# Patient Record
Sex: FEMALE | Race: BLACK OR AFRICAN AMERICAN | NOT HISPANIC OR LATINO | URBAN - METROPOLITAN AREA
[De-identification: names, ages, dates, MRNs, and addresses within clinical notes are randomized per-mention and may not be internally consistent; named-entity substitution may affect disease eponyms.]

---

## 2021-04-24 ENCOUNTER — INPATIENT (INPATIENT)
Facility: HOSPITAL | Age: 51
LOS: 1 days | Discharge: HOME | End: 2021-04-26
Attending: STUDENT IN AN ORGANIZED HEALTH CARE EDUCATION/TRAINING PROGRAM | Admitting: STUDENT IN AN ORGANIZED HEALTH CARE EDUCATION/TRAINING PROGRAM
Payer: COMMERCIAL

## 2021-04-24 VITALS
HEART RATE: 108 BPM | DIASTOLIC BLOOD PRESSURE: 78 MMHG | TEMPERATURE: 102 F | SYSTOLIC BLOOD PRESSURE: 122 MMHG | RESPIRATION RATE: 18 BRPM | OXYGEN SATURATION: 100 %

## 2021-04-24 DIAGNOSIS — K57.92 DIVERTICULITIS OF INTESTINE, PART UNSPECIFIED, WITHOUT PERFORATION OR ABSCESS WITHOUT BLEEDING: ICD-10-CM

## 2021-04-24 LAB
ALBUMIN SERPL ELPH-MCNC: 3.8 G/DL — SIGNIFICANT CHANGE UP (ref 3.3–5)
ALP SERPL-CCNC: 64 U/L — SIGNIFICANT CHANGE UP (ref 40–120)
ALT FLD-CCNC: 7 U/L — SIGNIFICANT CHANGE UP (ref 4–33)
ANION GAP SERPL CALC-SCNC: 14 MMOL/L — SIGNIFICANT CHANGE UP (ref 7–14)
ANISOCYTOSIS BLD QL: SLIGHT — SIGNIFICANT CHANGE UP
APPEARANCE UR: CLEAR — SIGNIFICANT CHANGE UP
APTT BLD: 39.4 SEC — HIGH (ref 27–36.3)
AST SERPL-CCNC: 12 U/L — SIGNIFICANT CHANGE UP (ref 4–32)
BACTERIA # UR AUTO: ABNORMAL
BASE EXCESS BLDV CALC-SCNC: -0.2 MMOL/L — SIGNIFICANT CHANGE UP (ref -3–2)
BASOPHILS # BLD AUTO: 0.02 K/UL — SIGNIFICANT CHANGE UP (ref 0–0.2)
BASOPHILS NFR BLD AUTO: 0.1 % — SIGNIFICANT CHANGE UP (ref 0–2)
BILIRUB SERPL-MCNC: 0.8 MG/DL — SIGNIFICANT CHANGE UP (ref 0.2–1.2)
BILIRUB UR-MCNC: NEGATIVE — SIGNIFICANT CHANGE UP
BLOOD GAS VENOUS - CREATININE: 1 MG/DL — SIGNIFICANT CHANGE UP (ref 0.5–1.3)
BLOOD GAS VENOUS COMPREHENSIVE RESULT: SIGNIFICANT CHANGE UP
BUN SERPL-MCNC: 8 MG/DL — SIGNIFICANT CHANGE UP (ref 7–23)
CALCIUM SERPL-MCNC: 9.5 MG/DL — SIGNIFICANT CHANGE UP (ref 8.4–10.5)
CHLORIDE BLDV-SCNC: 107 MMOL/L — SIGNIFICANT CHANGE UP (ref 96–108)
CHLORIDE SERPL-SCNC: 100 MMOL/L — SIGNIFICANT CHANGE UP (ref 98–107)
CO2 SERPL-SCNC: 22 MMOL/L — SIGNIFICANT CHANGE UP (ref 22–31)
COLOR SPEC: YELLOW — SIGNIFICANT CHANGE UP
CREAT SERPL-MCNC: 0.93 MG/DL — SIGNIFICANT CHANGE UP (ref 0.5–1.3)
DIFF PNL FLD: ABNORMAL
EOSINOPHIL # BLD AUTO: 0.01 K/UL — SIGNIFICANT CHANGE UP (ref 0–0.5)
EOSINOPHIL NFR BLD AUTO: 0.1 % — SIGNIFICANT CHANGE UP (ref 0–6)
EPI CELLS # UR: 3 /HPF — SIGNIFICANT CHANGE UP (ref 0–5)
GAS PNL BLDV: 133 MMOL/L — LOW (ref 136–146)
GLUCOSE BLDV-MCNC: 140 MG/DL — HIGH (ref 70–99)
GLUCOSE SERPL-MCNC: 142 MG/DL — HIGH (ref 70–99)
GLUCOSE UR QL: NEGATIVE — SIGNIFICANT CHANGE UP
HCG SERPL-ACNC: <5 MIU/ML — SIGNIFICANT CHANGE UP
HCO3 BLDV-SCNC: 24 MMOL/L — SIGNIFICANT CHANGE UP (ref 20–27)
HCT VFR BLD CALC: 35.6 % — SIGNIFICANT CHANGE UP (ref 34.5–45)
HCT VFR BLDA CALC: 37.8 % — SIGNIFICANT CHANGE UP (ref 34.5–46.5)
HGB BLD CALC-MCNC: 12.3 G/DL — SIGNIFICANT CHANGE UP (ref 11.5–15.5)
HGB BLD-MCNC: 11.5 G/DL — SIGNIFICANT CHANGE UP (ref 11.5–15.5)
HYPOCHROMIA BLD QL: SIGNIFICANT CHANGE UP
IANC: 11.74 K/UL — HIGH (ref 1.5–8.5)
IMM GRANULOCYTES NFR BLD AUTO: 0.3 % — SIGNIFICANT CHANGE UP (ref 0–1.5)
INR BLD: 1.24 RATIO — HIGH (ref 0.88–1.16)
KETONES UR-MCNC: ABNORMAL
LACTATE BLDV-MCNC: 1.6 MMOL/L — SIGNIFICANT CHANGE UP (ref 0.5–2)
LEUKOCYTE ESTERASE UR-ACNC: NEGATIVE — SIGNIFICANT CHANGE UP
LYMPHOCYTES # BLD AUTO: 1.15 K/UL — SIGNIFICANT CHANGE UP (ref 1–3.3)
LYMPHOCYTES # BLD AUTO: 8.3 % — LOW (ref 13–44)
MANUAL SMEAR VERIFICATION: SIGNIFICANT CHANGE UP
MCHC RBC-ENTMCNC: 21.7 PG — LOW (ref 27–34)
MCHC RBC-ENTMCNC: 32.3 GM/DL — SIGNIFICANT CHANGE UP (ref 32–36)
MCV RBC AUTO: 67.3 FL — LOW (ref 80–100)
MICROCYTES BLD QL: SIGNIFICANT CHANGE UP
MONOCYTES # BLD AUTO: 0.92 K/UL — HIGH (ref 0–0.9)
MONOCYTES NFR BLD AUTO: 6.6 % — SIGNIFICANT CHANGE UP (ref 2–14)
NEUTROPHILS # BLD AUTO: 11.74 K/UL — HIGH (ref 1.8–7.4)
NEUTROPHILS NFR BLD AUTO: 84.6 % — HIGH (ref 43–77)
NITRITE UR-MCNC: NEGATIVE — SIGNIFICANT CHANGE UP
NRBC # BLD: 0 /100 WBCS — SIGNIFICANT CHANGE UP
NRBC # FLD: 0 K/UL — SIGNIFICANT CHANGE UP
PCO2 BLDV: 40 MMHG — SIGNIFICANT CHANGE UP (ref 39–42)
PH BLDV: 7.39 — SIGNIFICANT CHANGE UP (ref 7.32–7.43)
PH UR: 6 — SIGNIFICANT CHANGE UP (ref 5–8)
PLAT MORPH BLD: NORMAL — SIGNIFICANT CHANGE UP
PLATELET # BLD AUTO: 247 K/UL — SIGNIFICANT CHANGE UP (ref 150–400)
PLATELET COUNT - ESTIMATE: NORMAL — SIGNIFICANT CHANGE UP
PO2 BLDV: 41 MMHG — HIGH (ref 35–40)
POLYCHROMASIA BLD QL SMEAR: SLIGHT — SIGNIFICANT CHANGE UP
POTASSIUM BLDV-SCNC: 3.5 MMOL/L — SIGNIFICANT CHANGE UP (ref 3.4–4.5)
POTASSIUM SERPL-MCNC: 3.7 MMOL/L — SIGNIFICANT CHANGE UP (ref 3.5–5.3)
POTASSIUM SERPL-SCNC: 3.7 MMOL/L — SIGNIFICANT CHANGE UP (ref 3.5–5.3)
PROT SERPL-MCNC: 7.9 G/DL — SIGNIFICANT CHANGE UP (ref 6–8.3)
PROT UR-MCNC: ABNORMAL
PROTHROM AB SERPL-ACNC: 14.1 SEC — HIGH (ref 10.6–13.6)
RBC # BLD: 5.29 M/UL — HIGH (ref 3.8–5.2)
RBC # FLD: 14 % — SIGNIFICANT CHANGE UP (ref 10.3–14.5)
RBC BLD AUTO: ABNORMAL
RBC CASTS # UR COMP ASSIST: 1 /HPF — SIGNIFICANT CHANGE UP (ref 0–4)
SAO2 % BLDV: 75.6 % — SIGNIFICANT CHANGE UP (ref 60–85)
SARS-COV-2 RNA SPEC QL NAA+PROBE: SIGNIFICANT CHANGE UP
SODIUM SERPL-SCNC: 136 MMOL/L — SIGNIFICANT CHANGE UP (ref 135–145)
SP GR SPEC: 1.02 — SIGNIFICANT CHANGE UP (ref 1.01–1.02)
TARGETS BLD QL SMEAR: SIGNIFICANT CHANGE UP
UROBILINOGEN FLD QL: SIGNIFICANT CHANGE UP
WBC # BLD: 13.88 K/UL — HIGH (ref 3.8–10.5)
WBC # FLD AUTO: 13.88 K/UL — HIGH (ref 3.8–10.5)
WBC UR QL: 3 /HPF — SIGNIFICANT CHANGE UP (ref 0–5)

## 2021-04-24 PROCEDURE — 74177 CT ABD & PELVIS W/CONTRAST: CPT | Mod: 26

## 2021-04-24 PROCEDURE — 99285 EMERGENCY DEPT VISIT HI MDM: CPT

## 2021-04-24 PROCEDURE — 99221 1ST HOSP IP/OBS SF/LOW 40: CPT | Mod: GC

## 2021-04-24 PROCEDURE — 71045 X-RAY EXAM CHEST 1 VIEW: CPT | Mod: 26

## 2021-04-24 RX ORDER — SODIUM CHLORIDE 9 MG/ML
1000 INJECTION, SOLUTION INTRAVENOUS
Refills: 0 | Status: DISCONTINUED | OUTPATIENT
Start: 2021-04-24 | End: 2021-04-25

## 2021-04-24 RX ORDER — SODIUM CHLORIDE 9 MG/ML
2500 INJECTION INTRAMUSCULAR; INTRAVENOUS; SUBCUTANEOUS ONCE
Refills: 0 | Status: COMPLETED | OUTPATIENT
Start: 2021-04-24 | End: 2021-04-24

## 2021-04-24 RX ORDER — PIPERACILLIN AND TAZOBACTAM 4; .5 G/20ML; G/20ML
3.38 INJECTION, POWDER, LYOPHILIZED, FOR SOLUTION INTRAVENOUS ONCE
Refills: 0 | Status: COMPLETED | OUTPATIENT
Start: 2021-04-24 | End: 2021-04-24

## 2021-04-24 RX ORDER — METRONIDAZOLE 500 MG
500 TABLET ORAL ONCE
Refills: 0 | Status: COMPLETED | OUTPATIENT
Start: 2021-04-24 | End: 2021-04-24

## 2021-04-24 RX ORDER — HYDROMORPHONE HYDROCHLORIDE 2 MG/ML
0.5 INJECTION INTRAMUSCULAR; INTRAVENOUS; SUBCUTANEOUS EVERY 4 HOURS
Refills: 0 | Status: DISCONTINUED | OUTPATIENT
Start: 2021-04-24 | End: 2021-04-25

## 2021-04-24 RX ORDER — ACETAMINOPHEN 500 MG
975 TABLET ORAL ONCE
Refills: 0 | Status: COMPLETED | OUTPATIENT
Start: 2021-04-24 | End: 2021-04-24

## 2021-04-24 RX ORDER — KETOROLAC TROMETHAMINE 30 MG/ML
15 SYRINGE (ML) INJECTION ONCE
Refills: 0 | Status: DISCONTINUED | OUTPATIENT
Start: 2021-04-24 | End: 2021-04-24

## 2021-04-24 RX ORDER — ENOXAPARIN SODIUM 100 MG/ML
40 INJECTION SUBCUTANEOUS DAILY
Refills: 0 | Status: DISCONTINUED | OUTPATIENT
Start: 2021-04-24 | End: 2021-04-26

## 2021-04-24 RX ORDER — PIPERACILLIN AND TAZOBACTAM 4; .5 G/20ML; G/20ML
3.38 INJECTION, POWDER, LYOPHILIZED, FOR SOLUTION INTRAVENOUS EVERY 8 HOURS
Refills: 0 | Status: DISCONTINUED | OUTPATIENT
Start: 2021-04-24 | End: 2021-04-26

## 2021-04-24 RX ORDER — ACETAMINOPHEN 500 MG
1000 TABLET ORAL ONCE
Refills: 0 | Status: COMPLETED | OUTPATIENT
Start: 2021-04-24 | End: 2021-04-24

## 2021-04-24 RX ORDER — HYDROMORPHONE HYDROCHLORIDE 2 MG/ML
1 INJECTION INTRAMUSCULAR; INTRAVENOUS; SUBCUTANEOUS EVERY 4 HOURS
Refills: 0 | Status: DISCONTINUED | OUTPATIENT
Start: 2021-04-24 | End: 2021-04-26

## 2021-04-24 RX ADMIN — SODIUM CHLORIDE 125 MILLILITER(S): 9 INJECTION, SOLUTION INTRAVENOUS at 17:58

## 2021-04-24 RX ADMIN — Medication 15 MILLIGRAM(S): at 12:03

## 2021-04-24 RX ADMIN — PIPERACILLIN AND TAZOBACTAM 200 GRAM(S): 4; .5 INJECTION, POWDER, LYOPHILIZED, FOR SOLUTION INTRAVENOUS at 15:01

## 2021-04-24 RX ADMIN — Medication 400 MILLIGRAM(S): at 21:01

## 2021-04-24 RX ADMIN — Medication 975 MILLIGRAM(S): at 11:49

## 2021-04-24 RX ADMIN — PIPERACILLIN AND TAZOBACTAM 25 GRAM(S): 4; .5 INJECTION, POWDER, LYOPHILIZED, FOR SOLUTION INTRAVENOUS at 22:56

## 2021-04-24 RX ADMIN — Medication 15 MILLIGRAM(S): at 13:27

## 2021-04-24 RX ADMIN — Medication 1000 MILLIGRAM(S): at 21:36

## 2021-04-24 RX ADMIN — Medication 975 MILLIGRAM(S): at 13:27

## 2021-04-24 RX ADMIN — Medication 100 MILLIGRAM(S): at 12:03

## 2021-04-24 RX ADMIN — SODIUM CHLORIDE 2500 MILLILITER(S): 9 INJECTION INTRAMUSCULAR; INTRAVENOUS; SUBCUTANEOUS at 11:30

## 2021-04-24 NOTE — ED PROVIDER NOTE - ATTENDING CONTRIBUTION TO CARE
Awake, Alert, Conversant.  Resting comfortably.  Breath sounds clear in all lung fields.  Normal and regular heart rate without murmurs, rubs, or gallops.  Normal S1/S2.  Abdomen soft, tender to palpation in left lower quadrant without rebound or guarding.  No lower extremity swelling or tenderness.  Oriented and conversant with fluent speech, moving all extremities with good strength.    Dr. Webb: I agree with the above provided history and exam and addend/modify it as follows.  50F Denies PMHx P/W LLQ pain x 3 days, accompanied by chills.  Possible diverticulitis vs colitis.  Appendicitis less likely but will R/O.  Doubt ovarian etiology given duration and character of pain.    Plan for labs, CT abdomen/pelvis, analgesia, empiric antibiotics given high likelihood of diverticulitis.    I Johnathan Webb MD performed a history and physical exam of the patient and discussed their management with the resident and /or advanced care provider. I reviewed the resident and /or ACP's note and agree with the documented findings and plan of care. My medical decision making and observations are found above.

## 2021-04-24 NOTE — ED PROVIDER NOTE - NS ED ROS FT
GENERAL: + fever and chills  EYES: No change in vision  HEENT: No trouble swallowing or speaking  CARDIAC: No chest pain  PULMONARY: No cough or SOB  GI: + abdominal pain, no nausea or no vomiting, no diarrhea + constipation  : No changes in urination  SKIN: No rashes  NEURO: No headache, no numbness  MSK: No joint pain  Otherwise as HPI or negative.

## 2021-04-24 NOTE — ED PROVIDER NOTE - PHYSICAL EXAMINATION
Physical Exam:  General: NAD, Conversive  Eyes: EOMI, Conjunctiva and sclera clear  Neck: No JVD  Heart: Normal S1, S2, no murmurs  Abdomen: Soft, tender to palpation on LLQ, nondistended, no CVA tenderness  Extremities: 2+ peripheral pulses, no edema  Psych: AAO X3  Neurologic: Non-focal

## 2021-04-24 NOTE — H&P ADULT - NSHPPHYSICALEXAM_GEN_ALL_CORE
Physical Examination:  GEN: NAD, resting quietly  NEURO: AAOx3, CN II-XII grossly intact, no focal deficits  PULM: symmetric chest rise bilaterally, no increased WOB  ABD: soft, tender to palpation in the LLQ with localized rebound and guarding but no tenderness or rebound  EXTR: no cyanosis or edema, moving all extremities

## 2021-04-24 NOTE — ED PROVIDER NOTE - CLINICAL SUMMARY MEDICAL DECISION MAKING FREE TEXT BOX
50 Y F presenting with LLQ pain. primary concern for diverticulitis (fever,chills, LLQ pain), lower likelihood but possible concern for ovarian pathology including ovarian abscess (LLQ, but no STI/STD history, no high risk sexual behaviors), stearchocholitis. Common labs, CT AP, fluids, antibiotics. 50 Y F presenting with LLQ pain. primary concern for diverticulitis (fever,chills, LLQ pain), lower likelihood but possible concern for ovarian pathology including ovarian abscess (LLQ, but no STI/STD history, no high risk sexual behaviors), stercoral colitis. Common labs, CT AP, fluids, antibiotics.

## 2021-04-24 NOTE — H&P ADULT - ASSESSMENT
Ms. Moise is a 50 year old woman without PMH who presents with acute onset abdominal pain, leukocytosis, and Hinchey 1 Diverticulitis on CT exam.    Plan:  - admit to Dr. Boo  - NPO/IVF   - continue Zosyn  - lov for dvt ppx  - PRN pain medication    Discussed with Dr. Rajeev DELGADO Team Surgery  [id number]

## 2021-04-24 NOTE — PROVIDER CONTACT NOTE (OTHER) - ASSESSMENT
Patient febrile to 100.8.
Patient with 100.5 oral Temp. Last received 1G of IV Offirmev at 9pm. Patient not warm to touch at this time.

## 2021-04-24 NOTE — ED PROVIDER NOTE - OBJECTIVE STATEMENT
50 Y F no pPMH presenting with 3 days of LLQ abdominal pain. Started suddenly in LLQ, associated with fever X3 days and chills. Pain is sharp, non radiating. No exacerbating features with PO consumption, in setting of 5 days of limited BM, improved with laxative on wednesday. Sent from UC for concern for diverticulitis. Denies any nausea, vomiting, diarrhea, hematochezia, dysuria.

## 2021-04-24 NOTE — H&P ADULT - NSHPLABSRESULTS_GEN_ALL_CORE
----------  LABORATORY DATA:  ----------                        11.5   13.88 )-----------( 247      ( 2021 11:50 )             35.6                   136  |  100  |  8   ----------------------------<  142<H>  3.7   |  22  |  0.93    Ca    9.5      2021 11:50    TPro  7.9  /  Alb  3.8  /  TBili  0.8  /  DBili  x   /  AST  12  /  ALT  7   /  AlkPhos  64      LIVER FUNCTIONS - ( 2021 11:50 )  Alb: 3.8 g/dL / Pro: 7.9 g/dL / ALK PHOS: 64 U/L / ALT: 7 U/L / AST: 12 U/L / GGT: x           PT/INR - ( 2021 11:50 )   PT: 14.1 sec;   INR: 1.24 ratio         PTT - ( 2021 11:50 )  PTT:39.4 sec  Urinalysis Basic - ( 2021 12:12 )    Color: Yellow / Appearance: Clear / S.023 / pH: x  Gluc: x / Ketone: Trace  / Bili: Negative / Urobili: <2 mg/dL   Blood: x / Protein: 30 mg/dL / Nitrite: Negative   Leuk Esterase: Negative / RBC: 1 /HPF / WBC 3 /HPF   Sq Epi: x / Non Sq Epi: 3 /HPF / Bacteria: Few                ----------  RADIOGRAPHIC DATA:  ---------  < from: CT Abdomen and Pelvis w/ IV Cont (21 @ 13:12) >      FINDINGS:  LOWER CHEST: Within normal limits.    LIVER: Scattered hypodensities, too small to characterize, largest in the hepatic dome.  BILE DUCTS: Normal caliber.  GALLBLADDER: Minimally distended.  SPLEEN: Hypodensities along the inferolateral aspect,likely cysts.  PANCREAS: Within normal limits.  ADRENALS: Within normal limits.  KIDNEYS/URETERS: Within normal limits.    BLADDER: Moderately distended.  REPRODUCTIVE ORGANS: Uterus and adnexa within normal limits.    BOWEL: Wall thickening of the proximal sigmoid colon with surrounding fatty infiltration consistent with phlegmon. Several foci of extraluminal air. No focal fluid collection. Appendix is normal.  PERITONEUM: No ascites.  VESSELS: Within normal limits.  RETROPERITONEUM/LYMPH NODES:No lymphadenopathy.  ABDOMINAL WALL: Moderate sized fat containing umbilical hernia.  BONES: Within normal limits.    IMPRESSION:  Acute contained perforated diverticulitis of the proximal sigmoid colon with several foci of extraluminal air without abscess formation or generalized pneumoperitoneum.    These findings were discussed with Dr. BETTY PERRY [phone] at 2021 1:33 PM by Dr. Pandya.      < end of copied text >

## 2021-04-24 NOTE — ED ADULT NURSE NOTE - OBJECTIVE STATEMENT
pt c/o pain to lower left side of abd . no n,v, passing gas/ pt ambulatory well/ iv placed in left ac labs sent off pt made comfortable

## 2021-04-24 NOTE — ED PROVIDER NOTE - CARE PLAN
Principal Discharge DX:	Diverticulitis  Secondary Diagnosis:	Sepsis  Secondary Diagnosis:	Abdominal pain

## 2021-04-24 NOTE — H&P ADULT - HISTORY OF PRESENT ILLNESS
Ms. Moise is a 50 year old woman without PMH who presents with acute onset abdominal pain. She first noted the pain on Wednesday after eating shrimp and presumed it to be rotten. She did not, however, describe any nausea, vomiting, or diarrhea. She did note some subjective fever and chills but did not take her temperature. The pain was sharp and in the LLQ and progressed throughout the weak without abating. After the pain worsened today, she decided to come in to the hospital. She has no history of this ever happening before and has no medical history. Surgical history only of prior  x2. She does not smoke and quit drinking a few months ago in order to lose some weight.     In the ED the patient was febrile to 102, and mildly tachycardic to 108 which has since resolved. Labs demonstrate leukocytosis to 13.9 and Ct demonstrated Hinchey 1 diverticulitis.

## 2021-04-24 NOTE — ED ADULT TRIAGE NOTE - CHIEF COMPLAINT QUOTE
Pt c/o constant generalized abdominal pain for the past 3 days accompanied with constipation. Pt also endorses intermittent fevers, currently febrile, + weakness/chills. Pt denies n/v/d. Pt sent in by MD for r/o diverticulitis, and CT scan.

## 2021-04-25 LAB
ANION GAP SERPL CALC-SCNC: 11 MMOL/L — SIGNIFICANT CHANGE UP (ref 7–14)
BUN SERPL-MCNC: 8 MG/DL — SIGNIFICANT CHANGE UP (ref 7–23)
CALCIUM SERPL-MCNC: 8.8 MG/DL — SIGNIFICANT CHANGE UP (ref 8.4–10.5)
CHLORIDE SERPL-SCNC: 106 MMOL/L — SIGNIFICANT CHANGE UP (ref 98–107)
CO2 SERPL-SCNC: 21 MMOL/L — LOW (ref 22–31)
COVID-19 SPIKE DOMAIN AB INTERP: NEGATIVE — SIGNIFICANT CHANGE UP
COVID-19 SPIKE DOMAIN ANTIBODY RESULT: 0.4 U/ML — SIGNIFICANT CHANGE UP
CREAT SERPL-MCNC: 0.94 MG/DL — SIGNIFICANT CHANGE UP (ref 0.5–1.3)
CULTURE RESULTS: SIGNIFICANT CHANGE UP
GLUCOSE SERPL-MCNC: 88 MG/DL — SIGNIFICANT CHANGE UP (ref 70–99)
HCT VFR BLD CALC: 31 % — LOW (ref 34.5–45)
HGB BLD-MCNC: 10.1 G/DL — LOW (ref 11.5–15.5)
MAGNESIUM SERPL-MCNC: 2.1 MG/DL — SIGNIFICANT CHANGE UP (ref 1.6–2.6)
MCHC RBC-ENTMCNC: 22.1 PG — LOW (ref 27–34)
MCHC RBC-ENTMCNC: 32.6 GM/DL — SIGNIFICANT CHANGE UP (ref 32–36)
MCV RBC AUTO: 68 FL — LOW (ref 80–100)
NRBC # BLD: 0 /100 WBCS — SIGNIFICANT CHANGE UP
NRBC # FLD: 0 K/UL — SIGNIFICANT CHANGE UP
PHOSPHATE SERPL-MCNC: 2.1 MG/DL — LOW (ref 2.5–4.5)
PLATELET # BLD AUTO: 205 K/UL — SIGNIFICANT CHANGE UP (ref 150–400)
POTASSIUM SERPL-MCNC: 4 MMOL/L — SIGNIFICANT CHANGE UP (ref 3.5–5.3)
POTASSIUM SERPL-SCNC: 4 MMOL/L — SIGNIFICANT CHANGE UP (ref 3.5–5.3)
RBC # BLD: 4.56 M/UL — SIGNIFICANT CHANGE UP (ref 3.8–5.2)
RBC # FLD: 13.9 % — SIGNIFICANT CHANGE UP (ref 10.3–14.5)
SARS-COV-2 IGG+IGM SERPL QL IA: 0.4 U/ML — SIGNIFICANT CHANGE UP
SARS-COV-2 IGG+IGM SERPL QL IA: NEGATIVE — SIGNIFICANT CHANGE UP
SODIUM SERPL-SCNC: 138 MMOL/L — SIGNIFICANT CHANGE UP (ref 135–145)
SPECIMEN SOURCE: SIGNIFICANT CHANGE UP
WBC # BLD: 10.28 K/UL — SIGNIFICANT CHANGE UP (ref 3.8–10.5)
WBC # FLD AUTO: 10.28 K/UL — SIGNIFICANT CHANGE UP (ref 3.8–10.5)

## 2021-04-25 RX ORDER — KETOROLAC TROMETHAMINE 30 MG/ML
15 SYRINGE (ML) INJECTION EVERY 6 HOURS
Refills: 0 | Status: DISCONTINUED | OUTPATIENT
Start: 2021-04-25 | End: 2021-04-26

## 2021-04-25 RX ORDER — DEXTROSE MONOHYDRATE, SODIUM CHLORIDE, AND POTASSIUM CHLORIDE 50; .745; 4.5 G/1000ML; G/1000ML; G/1000ML
1000 INJECTION, SOLUTION INTRAVENOUS
Refills: 0 | Status: DISCONTINUED | OUTPATIENT
Start: 2021-04-25 | End: 2021-04-26

## 2021-04-25 RX ORDER — ACETAMINOPHEN 500 MG
1000 TABLET ORAL EVERY 6 HOURS
Refills: 0 | Status: DISCONTINUED | OUTPATIENT
Start: 2021-04-25 | End: 2021-04-26

## 2021-04-25 RX ADMIN — Medication 1000 MILLIGRAM(S): at 07:24

## 2021-04-25 RX ADMIN — PIPERACILLIN AND TAZOBACTAM 25 GRAM(S): 4; .5 INJECTION, POWDER, LYOPHILIZED, FOR SOLUTION INTRAVENOUS at 14:30

## 2021-04-25 RX ADMIN — DEXTROSE MONOHYDRATE, SODIUM CHLORIDE, AND POTASSIUM CHLORIDE 125 MILLILITER(S): 50; .745; 4.5 INJECTION, SOLUTION INTRAVENOUS at 13:35

## 2021-04-25 RX ADMIN — DEXTROSE MONOHYDRATE, SODIUM CHLORIDE, AND POTASSIUM CHLORIDE 125 MILLILITER(S): 50; .745; 4.5 INJECTION, SOLUTION INTRAVENOUS at 21:52

## 2021-04-25 RX ADMIN — SODIUM CHLORIDE 125 MILLILITER(S): 9 INJECTION, SOLUTION INTRAVENOUS at 11:19

## 2021-04-25 RX ADMIN — Medication 400 MILLIGRAM(S): at 06:54

## 2021-04-25 RX ADMIN — PIPERACILLIN AND TAZOBACTAM 25 GRAM(S): 4; .5 INJECTION, POWDER, LYOPHILIZED, FOR SOLUTION INTRAVENOUS at 21:54

## 2021-04-25 RX ADMIN — ENOXAPARIN SODIUM 40 MILLIGRAM(S): 100 INJECTION SUBCUTANEOUS at 11:19

## 2021-04-25 RX ADMIN — PIPERACILLIN AND TAZOBACTAM 25 GRAM(S): 4; .5 INJECTION, POWDER, LYOPHILIZED, FOR SOLUTION INTRAVENOUS at 06:55

## 2021-04-25 RX ADMIN — Medication 63.75 MILLIMOLE(S): at 11:19

## 2021-04-25 NOTE — PROGRESS NOTE ADULT - ASSESSMENT
50F w no significant pmh pw abdominal pain, leukocytosis, and Hinchey 1 Diverticulitis on CT exam, febrile overnight    Plan:  - NPO/IVF   - continue Zosyn  - lov for dvt ppx  - PRN pain medication  - Monitor vitals, I/Os  - f/u AM labs      A Team Surgery  [id number]   50F w no significant pmh pw abdominal pain, leukocytosis, and Hinchey 1 Diverticulitis on CT exam, febrile overnight.     Plan:  - NPO/IVF   - Continue to monitor for fever, should be at least 24 hours afebrile prior for consideration for safe discharge  - continue Zosyn  - lov for dvt ppx  - PRN pain medication  - Monitor vitals, I/Os  - f/u AM labs      A Team Surgery  [id number]

## 2021-04-25 NOTE — PROGRESS NOTE ADULT - SUBJECTIVE AND OBJECTIVE BOX
Surgery Progress Note    Overnight: No acute events    SUBJECTIVE: Pt seen and examined at bedside. Patient comfortable. No nausea, vomiting, diarrhea. Pain is controlled. Tolerating diet.    Vital Signs Last 24 Hrs  T(C): 37.3 (2021 01:02), Max: 39 (2021 11:00)  T(F): 99.2 (2021 01:02), Max: 102.2 (2021 11:00)  HR: 84 (:11) (79 - 108)  BP: 111/59 (2021 01:11) (106/55 - 166/77)  BP(mean): --  RR: 16 (:11) (16 - 18)  SpO2: 100% (:) (99% - 100%)    Physical Exam:  General Appearance: Appears well, in no acute distress, awake, alert, and oriented x3.  Respiratory: No labored breathing  CV: Pulse regularly present  Abdomen: Soft, LLQ tenderness to palpation, nondistended w/o rebound tenderness or guarding.   Extremities: Warm and well perfused, moving spontaneously    LABS:                        11.5   13.88 )-----------( 247      ( 2021 11:50 )             35.6     04-24    136  |  100  |  8   ----------------------------<  142<H>  3.7   |  22  |  0.93    Ca    9.5      2021 11:50    TPro  7.9  /  Alb  3.8  /  TBili  0.8  /  DBili  x   /  AST  12  /  ALT  7   /  AlkPhos  64  04-24    PT/INR - ( 2021 11:50 )   PT: 14.1 sec;   INR: 1.24 ratio         PTT - ( 2021 11:50 )  PTT:39.4 sec  Urinalysis Basic - ( 2021 12:12 )    Color: Yellow / Appearance: Clear / S.023 / pH: x  Gluc: x / Ketone: Trace  / Bili: Negative / Urobili: <2 mg/dL   Blood: x / Protein: 30 mg/dL / Nitrite: Negative   Leuk Esterase: Negative / RBC: 1 /HPF / WBC 3 /HPF   Sq Epi: x / Non Sq Epi: 3 /HPF / Bacteria: Few        INs and OUTs:      Medications:  MEDICATIONS  (STANDING):  enoxaparin Injectable 40 milliGRAM(s) SubCutaneous daily  lactated ringers. 1000 milliLiter(s) (125 mL/Hr) IV Continuous <Continuous>  piperacillin/tazobactam IVPB.. 3.375 Gram(s) IV Intermittent every 8 hours    MEDICATIONS  (PRN):  HYDROmorphone  Injectable 0.5 milliGRAM(s) IV Push every 4 hours PRN Moderate Pain (4 - 6)  HYDROmorphone  Injectable 1 milliGRAM(s) IV Push every 4 hours PRN Severe Pain (7 - 10)   Surgery Progress Note    Overnight: No acute events    SUBJECTIVE: Pt seen and examined at bedside. Patient comfortable. No nausea, vomiting, diarrhea. Pain is controlled. Tolerating diet. Febrile overnight to 101.6. Patient refers she wants to leave when possible however understands current recommendations that she should remain for observation for at least 24 hours without fever.     Vital Signs Last 24 Hrs  T(C): 37.3 (2021 01:02), Max: 39 (2021 11:00)  T(F): 99.2 (2021 01:02), Max: 102.2 (2021 11:00)  HR: 84 (:11) (79 - 108)  BP: 111/59 (2021 01:11) (106/55 - 166/77)  BP(mean): --  RR: 16 (2021 01:11) (16 - 18)  SpO2: 100% (:) (99% - 100%)    Physical Exam:  General Appearance: Appears well, in no acute distress, awake, alert, and oriented x3.  Respiratory: No labored breathing  CV: Pulse regularly present  Abdomen: Soft, LLQ tenderness to palpation, nondistended w/o rebound tenderness or guarding.   Extremities: Warm and well perfused, moving spontaneously    LABS:                        11.5   13.88 )-----------( 247      ( 2021 11:50 )             35.6     04-24    136  |  100  |  8   ----------------------------<  142<H>  3.7   |  22  |  0.93    Ca    9.5      2021 11:50    TPro  7.9  /  Alb  3.8  /  TBili  0.8  /  DBili  x   /  AST  12  /  ALT  7   /  AlkPhos  64  04-24    PT/INR - ( 2021 11:50 )   PT: 14.1 sec;   INR: 1.24 ratio         PTT - ( 2021 11:50 )  PTT:39.4 sec  Urinalysis Basic - ( 2021 12:12 )    Color: Yellow / Appearance: Clear / S.023 / pH: x  Gluc: x / Ketone: Trace  / Bili: Negative / Urobili: <2 mg/dL   Blood: x / Protein: 30 mg/dL / Nitrite: Negative   Leuk Esterase: Negative / RBC: 1 /HPF / WBC 3 /HPF   Sq Epi: x / Non Sq Epi: 3 /HPF / Bacteria: Few        INs and OUTs:      Medications:  MEDICATIONS  (STANDING):  enoxaparin Injectable 40 milliGRAM(s) SubCutaneous daily  lactated ringers. 1000 milliLiter(s) (125 mL/Hr) IV Continuous <Continuous>  piperacillin/tazobactam IVPB.. 3.375 Gram(s) IV Intermittent every 8 hours    MEDICATIONS  (PRN):  HYDROmorphone  Injectable 0.5 milliGRAM(s) IV Push every 4 hours PRN Moderate Pain (4 - 6)  HYDROmorphone  Injectable 1 milliGRAM(s) IV Push every 4 hours PRN Severe Pain (7 - 10)

## 2021-04-26 ENCOUNTER — TRANSCRIPTION ENCOUNTER (OUTPATIENT)
Age: 51
End: 2021-04-26

## 2021-04-26 VITALS
TEMPERATURE: 98 F | RESPIRATION RATE: 18 BRPM | DIASTOLIC BLOOD PRESSURE: 78 MMHG | OXYGEN SATURATION: 100 % | SYSTOLIC BLOOD PRESSURE: 117 MMHG | HEART RATE: 62 BPM

## 2021-04-26 LAB
ANION GAP SERPL CALC-SCNC: 8 MMOL/L — SIGNIFICANT CHANGE UP (ref 7–14)
BUN SERPL-MCNC: 4 MG/DL — LOW (ref 7–23)
CALCIUM SERPL-MCNC: 8.9 MG/DL — SIGNIFICANT CHANGE UP (ref 8.4–10.5)
CHLORIDE SERPL-SCNC: 108 MMOL/L — HIGH (ref 98–107)
CO2 SERPL-SCNC: 23 MMOL/L — SIGNIFICANT CHANGE UP (ref 22–31)
CREAT SERPL-MCNC: 0.94 MG/DL — SIGNIFICANT CHANGE UP (ref 0.5–1.3)
GLUCOSE SERPL-MCNC: 107 MG/DL — HIGH (ref 70–99)
HCT VFR BLD CALC: 30.4 % — LOW (ref 34.5–45)
HGB BLD-MCNC: 9.9 G/DL — LOW (ref 11.5–15.5)
MAGNESIUM SERPL-MCNC: 2.1 MG/DL — SIGNIFICANT CHANGE UP (ref 1.6–2.6)
MCHC RBC-ENTMCNC: 21.8 PG — LOW (ref 27–34)
MCHC RBC-ENTMCNC: 32.6 GM/DL — SIGNIFICANT CHANGE UP (ref 32–36)
MCV RBC AUTO: 67 FL — LOW (ref 80–100)
NRBC # BLD: 0 /100 WBCS — SIGNIFICANT CHANGE UP
NRBC # FLD: 0 K/UL — SIGNIFICANT CHANGE UP
PHOSPHATE SERPL-MCNC: 2.6 MG/DL — SIGNIFICANT CHANGE UP (ref 2.5–4.5)
PLATELET # BLD AUTO: 207 K/UL — SIGNIFICANT CHANGE UP (ref 150–400)
POTASSIUM SERPL-MCNC: 4 MMOL/L — SIGNIFICANT CHANGE UP (ref 3.5–5.3)
POTASSIUM SERPL-SCNC: 4 MMOL/L — SIGNIFICANT CHANGE UP (ref 3.5–5.3)
RBC # BLD: 4.54 M/UL — SIGNIFICANT CHANGE UP (ref 3.8–5.2)
RBC # FLD: 13.9 % — SIGNIFICANT CHANGE UP (ref 10.3–14.5)
SODIUM SERPL-SCNC: 139 MMOL/L — SIGNIFICANT CHANGE UP (ref 135–145)
WBC # BLD: 6.72 K/UL — SIGNIFICANT CHANGE UP (ref 3.8–10.5)
WBC # FLD AUTO: 6.72 K/UL — SIGNIFICANT CHANGE UP (ref 3.8–10.5)

## 2021-04-26 PROCEDURE — 99231 SBSQ HOSP IP/OBS SF/LOW 25: CPT | Mod: GC

## 2021-04-26 RX ORDER — DEXTROSE MONOHYDRATE, SODIUM CHLORIDE, AND POTASSIUM CHLORIDE 50; .745; 4.5 G/1000ML; G/1000ML; G/1000ML
1000 INJECTION, SOLUTION INTRAVENOUS
Refills: 0 | Status: DISCONTINUED | OUTPATIENT
Start: 2021-04-26 | End: 2021-04-26

## 2021-04-26 RX ADMIN — ENOXAPARIN SODIUM 40 MILLIGRAM(S): 100 INJECTION SUBCUTANEOUS at 11:04

## 2021-04-26 RX ADMIN — DEXTROSE MONOHYDRATE, SODIUM CHLORIDE, AND POTASSIUM CHLORIDE 50 MILLILITER(S): 50; .745; 4.5 INJECTION, SOLUTION INTRAVENOUS at 15:56

## 2021-04-26 RX ADMIN — PIPERACILLIN AND TAZOBACTAM 25 GRAM(S): 4; .5 INJECTION, POWDER, LYOPHILIZED, FOR SOLUTION INTRAVENOUS at 05:37

## 2021-04-26 RX ADMIN — Medication 63.75 MILLIMOLE(S): at 10:59

## 2021-04-26 RX ADMIN — DEXTROSE MONOHYDRATE, SODIUM CHLORIDE, AND POTASSIUM CHLORIDE 75 MILLILITER(S): 50; .745; 4.5 INJECTION, SOLUTION INTRAVENOUS at 11:06

## 2021-04-26 RX ADMIN — PIPERACILLIN AND TAZOBACTAM 25 GRAM(S): 4; .5 INJECTION, POWDER, LYOPHILIZED, FOR SOLUTION INTRAVENOUS at 14:10

## 2021-04-26 NOTE — PROGRESS NOTE ADULT - SUBJECTIVE AND OBJECTIVE BOX
Surgery Progress Note    Overnight: No acute events    SUBJECTIVE: Pt seen and examined at bedside. Patient comfortable. No nausea, vomiting, diarrhea. Pain is controlled. Tolerating diet. Patient refers she wants to leave when possible however understands current recommendations that she should remain for observation for at least 24 hours without fever.     Vital Signs Last 24 Hrs  T(C): 37.3 (2021 01:02), Max: 39 (2021 11:00)  T(F): 99.2 (2021 01:02), Max: 102.2 (2021 11:00)  HR: 84 (:11) (79 - 108)  BP: 111/59 (:11) (106/55 - 166/77)  BP(mean): --  RR: 16 (2021 01:11) (16 - 18)  SpO2: 100% (:) (99% - 100%)    Physical Exam:  General Appearance: Appears well, in no acute distress, awake, alert, and oriented x3.  Respiratory: No labored breathing  CV: Pulse regularly present  Abdomen: Soft, LLQ tenderness to palpation, nondistended w/o rebound tenderness or guarding.   Extremities: Warm and well perfused, moving spontaneously    LABS:                        11.5   13.88 )-----------( 247      ( 2021 11:50 )             35.6     04-24    136  |  100  |  8   ----------------------------<  142<H>  3.7   |  22  |  0.93    Ca    9.5      2021 11:50    TPro  7.9  /  Alb  3.8  /  TBili  0.8  /  DBili  x   /  AST  12  /  ALT  7   /  AlkPhos  64  04-24    PT/INR - ( 2021 11:50 )   PT: 14.1 sec;   INR: 1.24 ratio         PTT - ( 2021 11:50 )  PTT:39.4 sec  Urinalysis Basic - ( 2021 12:12 )    Color: Yellow / Appearance: Clear / S.023 / pH: x  Gluc: x / Ketone: Trace  / Bili: Negative / Urobili: <2 mg/dL   Blood: x / Protein: 30 mg/dL / Nitrite: Negative   Leuk Esterase: Negative / RBC: 1 /HPF / WBC 3 /HPF   Sq Epi: x / Non Sq Epi: 3 /HPF / Bacteria: Few        INs and OUTs:      Medications:  MEDICATIONS  (STANDING):  enoxaparin Injectable 40 milliGRAM(s) SubCutaneous daily  lactated ringers. 1000 milliLiter(s) (125 mL/Hr) IV Continuous <Continuous>  piperacillin/tazobactam IVPB.. 3.375 Gram(s) IV Intermittent every 8 hours    MEDICATIONS  (PRN):  HYDROmorphone  Injectable 0.5 milliGRAM(s) IV Push every 4 hours PRN Moderate Pain (4 - 6)  HYDROmorphone  Injectable 1 milliGRAM(s) IV Push every 4 hours PRN Severe Pain (7 - 10)

## 2021-04-26 NOTE — DISCHARGE NOTE PROVIDER - CARE PROVIDER_API CALL
Isaak Boo)  ColonRectal Surgery; Surgery  [facility], [address] [place] [address]  Phone: [phone]  Fax: [phone]  Follow Up Time:    Isaak Boo)  ColonRectal Surgery; Surgery  [facility], [address] [place] [address]  Phone: [phone]  Fax: [phone]  Follow Up Time: 1 week

## 2021-04-26 NOTE — DISCHARGE NOTE PROVIDER - NSDCCPCAREPLAN_GEN_ALL_CORE_FT
PRINCIPAL DISCHARGE DIAGNOSIS  Diagnosis: Diverticulitis  Assessment and Plan of Treatment: You have had a course of antibiotics treated with IV antibiotics. Please continue to take antibiotics by mouth as directed for 2 weeks. Please see Dr. Boo in his office in 1 week to repeat CT scan. Please do not come off the antibiotics until you at least see Dr. Boo. Please call Dr. Boo's office or return to the ED if you feel extreme abdominal pain, nausea, vomiting, or fever >101F.      SECONDARY DISCHARGE DIAGNOSES  Diagnosis: Sepsis  Assessment and Plan of Treatment:     Diagnosis: Abdominal pain  Assessment and Plan of Treatment:

## 2021-04-26 NOTE — DISCHARGE NOTE NURSING/CASE MANAGEMENT/SOCIAL WORK - PATIENT PORTAL LINK FT
You can access the FollowMyHealth Patient Portal offered by [facility] by registering at the following website: [email or web address]. By joining [employer]’s FollowMyHealth portal, you will also be able to view your health information using other applications (apps) compatible with our system.

## 2021-04-26 NOTE — DISCHARGE NOTE PROVIDER - HOSPITAL COURSE
50 year old woman without PMH who presents with acute onset abdominal pain. She first noted the pain on Wednesday after eating shrimp and presumed it to be rotten. She did not, however, describe any nausea, vomiting, or diarrhea. She did note some subjective fever and chills but did not take her temperature. The pain was sharp and in the LLQ and progressed throughout the weak without abating. After the pain worsened today, she decided to come in to the hospital. She has no history of this ever happening before and has no medical history. Surgical history only of prior  x2. She does not smoke and quit drinking a few months ago in order to lose some weight.     In the ED the patient was febrile to 102, and mildly tachycardic to 108 which has since resolved. Labs demonstrate leukocytosis to 13.9 and Ct demonstrated Hinchey 1 diverticulitis.    Pt admitted to surgical service and started on IV abx. Pt febrile for which cultures obtained, NGTD.     When clinically improved diet slowly advanced as tolerated.    Per Attending pt now stable for discharge. Pt hemodynamically stable, tolerating diet, and pain controlled. Pt to follow up with Dr Boo as an outpatient, instructed to call to schedule appointment. Pt will need to follow up with a gastroenterologist as an outpatient to have follow up colonoscopy performed 50 year old woman without PMH who presents with acute onset abdominal pain. She first noted the pain on Wednesday after eating shrimp and presumed it to be rotten. She did not, however, describe any nausea, vomiting, or diarrhea. She did note some subjective fever and chills but did not take her temperature. The pain was sharp and in the LLQ and progressed throughout the weak without abating. After the pain worsened today, she decided to come in to the hospital. She has no history of this ever happening before and has no medical history. Surgical history only of prior  x2. She does not smoke and quit drinking a few months ago in order to lose some weight.     In the ED the patient was febrile to 102, and mildly tachycardic to 108 which has since resolved. Labs demonstrate leukocytosis to 13.9 and Ct demonstrated Hinchey 1 diverticulitis.    Pt admitted to surgical service and managed conservatively with bowel rest, abx, and IVF.  Pt febrile for which cultures obtained, NGTD.     When clinically improved diet slowly advanced as tolerated.    Per Attending pt now stable for discharge. Pt hemodynamically stable, tolerating diet, and pain controlled. Pt to follow up with Dr Boo as an outpatient, instructed to call to schedule appointment. Pt will need to follow up with a gastroenterologist as an outpatient to have follow up colonoscopy performed 50 year old woman without PMH who presents with acute onset abdominal pain. She first noted the pain on Wednesday after eating shrimp and presumed it to be rotten. She did not, however, describe any nausea, vomiting, or diarrhea. She did note some subjective fever and chills but did not take her temperature. The pain was sharp and in the LLQ and progressed throughout the weak without abating. After the pain worsened today, she decided to come in to the hospital. She has no history of this ever happening before and has no medical history. Surgical history only of prior  x2. She does not smoke and quit drinking a few months ago in order to lose some weight.     In the ED the patient was febrile to 102, and mildly tachycardic to 108 which has since resolved. Labs demonstrate leukocytosis to 13.9 and Ct demonstrated Hinchey 1 diverticulitis.    Pt admitted to surgical service and managed conservatively with bowel rest, abx, and IVF.  Pt febrile for which cultures obtained, NGTD.     When clinically improved diet slowly advanced as tolerated.    Per Attending pt now stable for discharge. Pt hemodynamically stable, tolerating diet, and pain controlled. Pt to follow up with Dr Boo as an outpatient in 1 week, instructed to call to schedule appointment. Pt will need to follow up with a gastroenterologist as an outpatient to have follow up colonoscopy performed

## 2021-04-26 NOTE — PROGRESS NOTE ADULT - ASSESSMENT
50F w no significant pmh pw abdominal pain, leukocytosis, and Hinchey 1 Diverticulitis on CT exam.    Plan:  - NPO/IVF   - Continue to monitor for fever, should be at least 24 hours afebrile prior for consideration for safe discharge  - continue Zosyn  - lov for dvt ppx  - PRN pain medication  - Monitor vitals, I/Os  - f/u AM labs      A Team Surgery  [id number]   50F w no significant pmh pw abdominal pain, leukocytosis, and Hinchey 1 Diverticulitis on CT exam. Has been afebrile overnight.    Plan:  - Advance to [facility]  - continue Zosyn  - lov for dvt ppx  - PRN pain medication  - Monitor vitals, I/Os  - f/u AM labs  -Dispo planning      A Team Surgery  [id number]

## 2021-04-28 PROBLEM — Z00.00 ENCOUNTER FOR PREVENTIVE HEALTH EXAMINATION: Status: ACTIVE | Noted: 2021-04-28

## 2021-04-28 PROBLEM — Z78.9 OTHER SPECIFIED HEALTH STATUS: Chronic | Status: ACTIVE | Noted: 2021-04-24

## 2021-04-29 LAB
CULTURE RESULTS: SIGNIFICANT CHANGE UP
CULTURE RESULTS: SIGNIFICANT CHANGE UP
SPECIMEN SOURCE: SIGNIFICANT CHANGE UP
SPECIMEN SOURCE: SIGNIFICANT CHANGE UP

## 2021-05-11 ENCOUNTER — APPOINTMENT (OUTPATIENT)
Dept: COLORECTAL SURGERY | Facility: CLINIC | Age: 51
End: 2021-05-11
Payer: COMMERCIAL

## 2021-05-11 DIAGNOSIS — Z78.9 OTHER SPECIFIED HEALTH STATUS: ICD-10-CM

## 2021-05-11 PROCEDURE — 99072 ADDL SUPL MATRL&STAF TM PHE: CPT

## 2021-05-11 PROCEDURE — 99213 OFFICE O/P EST LOW 20 MIN: CPT

## 2021-05-11 NOTE — HISTORY OF PRESENT ILLNESS
[FreeTextEntry1] : 50-year-old female with recent hospital admission for perforated diverticulitis. Patient has completed her antibiotic regimen yesterday. She denies pain reports normal bowel movements. No fevers or chills no nausea or vomiting she is tolerating diet. Some straining with bowel movements and she continues to follow a low residue diet no aggravating factors

## 2021-05-11 NOTE — ASSESSMENT
[FreeTextEntry1] : Perforated diverticulitis\par -We'll repeat CT scan to insure resolution of abscess\par -If diverticulitis has resolved, we'll schedule colonoscopy 4-6 weeks after hospital admission\par -We briefly discussed indications for surgical resection.\par -All questions were answered

## 2021-05-13 ENCOUNTER — RESULT REVIEW (OUTPATIENT)
Age: 51
End: 2021-05-13

## 2021-05-13 ENCOUNTER — OUTPATIENT (OUTPATIENT)
Dept: OUTPATIENT SERVICES | Facility: HOSPITAL | Age: 51
LOS: 1 days | End: 2021-05-13
Payer: COMMERCIAL

## 2021-05-13 ENCOUNTER — APPOINTMENT (OUTPATIENT)
Dept: CT IMAGING | Facility: IMAGING CENTER | Age: 51
End: 2021-05-13

## 2021-05-13 DIAGNOSIS — Z00.8 ENCOUNTER FOR OTHER GENERAL EXAMINATION: ICD-10-CM

## 2021-05-13 PROCEDURE — 74177 CT ABD & PELVIS W/CONTRAST: CPT

## 2021-05-13 PROCEDURE — 74177 CT ABD & PELVIS W/CONTRAST: CPT | Mod: 26

## 2021-06-13 DIAGNOSIS — Z01.818 ENCOUNTER FOR OTHER PREPROCEDURAL EXAMINATION: ICD-10-CM

## 2021-06-14 ENCOUNTER — APPOINTMENT (OUTPATIENT)
Dept: DISASTER EMERGENCY | Facility: CLINIC | Age: 51
End: 2021-06-14

## 2021-06-15 LAB — SARS-COV-2 N GENE NPH QL NAA+PROBE: NOT DETECTED

## 2021-06-17 ENCOUNTER — APPOINTMENT (OUTPATIENT)
Dept: COLORECTAL SURGERY | Facility: CLINIC | Age: 51
End: 2021-06-17
Payer: COMMERCIAL

## 2021-06-17 VITALS — BODY MASS INDEX: 30.36 KG/M2 | WEIGHT: 205 LBS | HEIGHT: 69 IN

## 2021-06-17 DIAGNOSIS — K57.32 DIVERTICULITIS OF LARGE INTESTINE W/OUT PERFORATION OR ABSCESS W/OUT BLEEDING: ICD-10-CM

## 2021-06-17 DIAGNOSIS — K64.9 UNSPECIFIED HEMORRHOIDS: ICD-10-CM

## 2021-06-17 PROCEDURE — 99072 ADDL SUPL MATRL&STAF TM PHE: CPT

## 2021-06-17 PROCEDURE — 45378 DIAGNOSTIC COLONOSCOPY: CPT

## 2021-06-17 RX ORDER — AMOXICILLIN AND CLAVULANATE POTASSIUM 875; 125 MG/1; MG/1
TABLET, COATED ORAL
Refills: 0 | Status: DISCONTINUED | COMMUNITY
End: 2021-06-17